# Patient Record
Sex: FEMALE | Race: WHITE | Employment: FULL TIME | URBAN - METROPOLITAN AREA
[De-identification: names, ages, dates, MRNs, and addresses within clinical notes are randomized per-mention and may not be internally consistent; named-entity substitution may affect disease eponyms.]

---

## 2019-12-26 ENCOUNTER — OFFICE VISIT (OUTPATIENT)
Dept: FAMILY MEDICINE | Facility: CLINIC | Age: 54
End: 2019-12-26
Payer: COMMERCIAL

## 2019-12-26 VITALS
TEMPERATURE: 97.6 F | SYSTOLIC BLOOD PRESSURE: 107 MMHG | HEIGHT: 65 IN | WEIGHT: 177.3 LBS | DIASTOLIC BLOOD PRESSURE: 71 MMHG | HEART RATE: 87 BPM | RESPIRATION RATE: 18 BRPM | BODY MASS INDEX: 29.54 KG/M2 | OXYGEN SATURATION: 98 %

## 2019-12-26 DIAGNOSIS — J02.9 SORE THROAT: Primary | ICD-10-CM

## 2019-12-26 DIAGNOSIS — J02.0 STREPTOCOCCAL PHARYNGITIS: ICD-10-CM

## 2019-12-26 LAB
DEPRECATED S PYO AG THROAT QL EIA: NORMAL
SPECIMEN SOURCE: NORMAL

## 2019-12-26 PROCEDURE — 99213 OFFICE O/P EST LOW 20 MIN: CPT

## 2019-12-26 PROCEDURE — 87880 STREP A ASSAY W/OPTIC: CPT | Performed by: PHYSICIAN ASSISTANT

## 2019-12-26 PROCEDURE — 87081 CULTURE SCREEN ONLY: CPT | Performed by: PHYSICIAN ASSISTANT

## 2019-12-26 RX ORDER — IBUPROFEN 200 MG
TABLET ORAL
COMMUNITY
Start: 2015-02-23

## 2019-12-26 SDOH — HEALTH STABILITY: MENTAL HEALTH: HOW OFTEN DO YOU HAVE A DRINK CONTAINING ALCOHOL?: 2-4 TIMES A MONTH

## 2019-12-26 ASSESSMENT — MIFFLIN-ST. JEOR: SCORE: 1415.11

## 2019-12-26 NOTE — PROGRESS NOTES
"SUBJECTIVE:   Kasey Serna is a 54 year old female presenting with a chief complaint of chills, stuffy nose, sore throat, headache, body aches and fatigue.  Onset of symptoms was 4 day(s) ago.  Course of illness is same.    Severity moderate  Treatment measures tried include Tylenol/Ibuprofen, Fluids and Rest.  Predisposing factors include None.    No past medical history on file.  Current Outpatient Medications   Medication Sig Dispense Refill     ibuprofen (ADVIL/MOTRIN) 200 MG tablet        psyllium (METAMUCIL MULTIHEALTH FIBER) 63 % POWD        Social History     Tobacco Use     Smoking status: Never Smoker     Smokeless tobacco: Never Used   Substance Use Topics     Alcohol use: Yes     Frequency: 2-4 times a month       ROS:  CONSTITUTIONAL:NEGATIVE for fever, chills, change in weight  INTEGUMENTARY/SKIN: NEGATIVE for worrisome rashes, moles or lesions  EYES: NEGATIVE for vision changes or irritation  ENT/MOUTH: See HPI  RESP:NEGATIVE for significant SOB and cough.   CV: NEGATIVE for chest pain, palpitations or peripheral edema  GI: NEGATIVE for nausea, abdominal pain, heartburn, or change in bowel habits  HEME/ALLERGY/IMMUNE: NEGATIVE for bleeding problems  PSYCHIATRIC: NEGATIVE for changes in mood or affect    OBJECTIVE:  /71   Pulse 87   Temp 97.6  F (36.4  C) (Oral)   Resp 18   Ht 1.651 m (5' 5\")   Wt 80.4 kg (177 lb 4.8 oz)   SpO2 98%   BMI 29.50 kg/m    GENERAL APPEARANCE: healthy, alert and no distress  EYES: EOMI,  PERRL, conjunctiva clear  HENT: ear canals and TM's normal.  Nose without ulcers, erythema or lesions. Pharynx is moderately erythematous and with moderate white exudates.   NECK: cervical lymph nodes are tender and swollen bilaterally.   RESP: lungs clear to auscultation - no rales, rhonchi or wheezes  CV: regular rates and rhythm, normal S1 S2, no murmur noted  ABDOMEN:  soft, nontender, no HSM or masses and bowel sounds normal  NEURO: Normal strength and tone, sensory exam " grossly normal,  normal speech and mentation  SKIN: no suspicious lesions or rashes    ASSESSMENT/PLAN:    Results for orders placed or performed in visit on 12/26/19   Rapid strep screen     Status: None   Result Value Ref Range    Specimen Description Throat     Rapid Strep A Screen       NEGATIVE: No Group A streptococcal antigen detected by immunoassay, await culture report.     (J02.9) Sore throat  (primary encounter diagnosis)  Plan: Rapid strep screen, Beta strep group A culture,        amoxicillin-clavulanate (AUGMENTIN) 875-125 MG         tablet    (J02.0) Streptococcal pharyngitis  Comment: Although the patient's rapid strep was negative, I would like to treat her empirically for strep anyhow due to her presentation and PE.  Plan: amoxicillin-clavulanate (AUGMENTIN) 875-125 MG         tablet    Age 6-12:   Okay to use Children Motrin OTC    Adults:  Okay to take acetaminophen 500 mg- 2 tabs (Total of 1000 mg) every 8 hrs   Okay to take ibuprofen 200 mg- 3 tabs (Total of 600 mg) every 6 hours        Okay to use Neti pot for sinus lavage up to three times daily for congestion and sinus pressure. Daily hot shower can be beneficial for congestion and body aches. Okay to use bedroom vaporizer or humidifier if symptoms are worse at night. Nightly Vicks Vapor rub okay to use for sleep.     OTC cough medication and decongestants okay if not prescribed by me during this visit.       Okay to use salt water gargles and lozenges for throat discomfort.     Patient will need to get plenty of rest and drink at least 1.5-2 liters of fluids daily for adults and 1-1.5 liters for children. If vomiting and not tolerating liquids for more than 24 hrs, please go to your nearest emergency department for IV fluids and further treatment.     Patient is not contagious after 1 week from start of symptoms. If possible, wear mask for first 7 days. Wash hands regularly and vigorously for 30 seconds often.     Patient was advised to  return to clinic if symptoms do not improve in the amount of time specified in the AVS or if symptoms worsen. Patient educated on red flag symptoms and asked to go directly to the ED if symptoms present themselves.     Oc Leon PA-C on 12/26/2019 at 4:45 PM

## 2019-12-27 LAB
BACTERIA SPEC CULT: NORMAL
SPECIMEN SOURCE: NORMAL